# Patient Record
Sex: MALE | Race: WHITE | Employment: OTHER | URBAN - METROPOLITAN AREA
[De-identification: names, ages, dates, MRNs, and addresses within clinical notes are randomized per-mention and may not be internally consistent; named-entity substitution may affect disease eponyms.]

---

## 2021-11-02 ENCOUNTER — HOSPITAL ENCOUNTER (OUTPATIENT)
Dept: PREADMISSION TESTING | Age: 76
Discharge: HOME | End: 2021-11-02
Payer: MEDICARE

## 2021-11-02 PROCEDURE — 88305 TISSUE EXAM BY PATHOLOGIST: CPT

## 2021-11-02 PROCEDURE — U0005 INFEC AGEN DETEC AMPLI PROBE: HCPCS

## 2021-11-03 LAB — SARS-COV-2, NAA: NOT DETECTED

## 2021-11-05 ENCOUNTER — HOSPITAL ENCOUNTER (OUTPATIENT)
Age: 76
Setting detail: OUTPATIENT SURGERY
Discharge: HOME HEALTH | End: 2021-11-05
Attending: INTERNAL MEDICINE | Admitting: INTERNAL MEDICINE
Payer: MEDICARE

## 2021-11-05 ENCOUNTER — ANESTHESIA (OUTPATIENT)
Dept: ENDOSCOPY | Age: 76
End: 2021-11-05
Payer: MEDICARE

## 2021-11-05 ENCOUNTER — ANESTHESIA EVENT (OUTPATIENT)
Dept: ENDOSCOPY | Age: 76
End: 2021-11-05
Payer: MEDICARE

## 2021-11-05 VITALS
DIASTOLIC BLOOD PRESSURE: 71 MMHG | BODY MASS INDEX: 28.15 KG/M2 | HEIGHT: 72 IN | WEIGHT: 207.8 LBS | TEMPERATURE: 98 F | SYSTOLIC BLOOD PRESSURE: 123 MMHG | OXYGEN SATURATION: 100 % | HEART RATE: 52 BPM | RESPIRATION RATE: 16 BRPM

## 2021-11-05 PROCEDURE — 2709999900 HC NON-CHARGEABLE SUPPLY: Performed by: INTERNAL MEDICINE

## 2021-11-05 PROCEDURE — 74011250636 HC RX REV CODE- 250/636: Performed by: INTERNAL MEDICINE

## 2021-11-05 PROCEDURE — 74011000250 HC RX REV CODE- 250: Performed by: NURSE ANESTHETIST, CERTIFIED REGISTERED

## 2021-11-05 PROCEDURE — 77030031670 HC DEV INFL ENDOTEK BIG60 MRTM -B: Performed by: INTERNAL MEDICINE

## 2021-11-05 PROCEDURE — 76060000032 HC ANESTHESIA 0.5 TO 1 HR: Performed by: INTERNAL MEDICINE

## 2021-11-05 PROCEDURE — 77030013991 HC SNR POLYP ENDOSC BSC -A: Performed by: INTERNAL MEDICINE

## 2021-11-05 PROCEDURE — 74011250636 HC RX REV CODE- 250/636: Performed by: NURSE ANESTHETIST, CERTIFIED REGISTERED

## 2021-11-05 PROCEDURE — 76040000007: Performed by: INTERNAL MEDICINE

## 2021-11-05 PROCEDURE — C1726 CATH, BAL DIL, NON-VASCULAR: HCPCS | Performed by: INTERNAL MEDICINE

## 2021-11-05 PROCEDURE — 77030021593 HC FCPS BIOP ENDOSC BSC -A: Performed by: INTERNAL MEDICINE

## 2021-11-05 RX ORDER — PROPOFOL 10 MG/ML
INJECTION, EMULSION INTRAVENOUS AS NEEDED
Status: DISCONTINUED | OUTPATIENT
Start: 2021-11-05 | End: 2021-11-05 | Stop reason: HOSPADM

## 2021-11-05 RX ORDER — PYRIDOXINE HCL (VITAMIN B6) 100 MG
100 TABLET ORAL DAILY
COMMUNITY

## 2021-11-05 RX ORDER — ACETAMINOPHEN 325 MG/1
500 TABLET ORAL
COMMUNITY

## 2021-11-05 RX ORDER — CHOLECALCIFEROL (VITAMIN D3) 125 MCG
2 CAPSULE ORAL
COMMUNITY

## 2021-11-05 RX ORDER — ROSUVASTATIN CALCIUM 20 MG/1
20 TABLET, COATED ORAL
COMMUNITY

## 2021-11-05 RX ORDER — MELATONIN 5 MG
5 CAPSULE ORAL
COMMUNITY

## 2021-11-05 RX ORDER — SODIUM CHLORIDE 0.9 % (FLUSH) 0.9 %
5-40 SYRINGE (ML) INJECTION EVERY 8 HOURS
Status: CANCELLED | OUTPATIENT
Start: 2021-11-05

## 2021-11-05 RX ORDER — OMEPRAZOLE 20 MG/1
20 TABLET, DELAYED RELEASE ORAL DAILY
COMMUNITY

## 2021-11-05 RX ORDER — LIDOCAINE HYDROCHLORIDE 20 MG/ML
INJECTION, SOLUTION EPIDURAL; INFILTRATION; INTRACAUDAL; PERINEURAL AS NEEDED
Status: DISCONTINUED | OUTPATIENT
Start: 2021-11-05 | End: 2021-11-05 | Stop reason: HOSPADM

## 2021-11-05 RX ORDER — DEXTROMETHORPHAN/PSEUDOEPHED 2.5-7.5/.8
1.2 DROPS ORAL
Status: CANCELLED | OUTPATIENT
Start: 2021-11-05

## 2021-11-05 RX ORDER — EPINEPHRINE 0.1 MG/ML
1 INJECTION INTRACARDIAC; INTRAVENOUS
Status: CANCELLED | OUTPATIENT
Start: 2021-11-05 | End: 2021-11-06

## 2021-11-05 RX ORDER — NALOXONE HYDROCHLORIDE 0.4 MG/ML
0.4 INJECTION, SOLUTION INTRAMUSCULAR; INTRAVENOUS; SUBCUTANEOUS
Status: CANCELLED | OUTPATIENT
Start: 2021-11-05 | End: 2021-11-05

## 2021-11-05 RX ORDER — SODIUM CHLORIDE 9 MG/ML
125 INJECTION, SOLUTION INTRAVENOUS CONTINUOUS
Status: DISCONTINUED | OUTPATIENT
Start: 2021-11-05 | End: 2021-11-05 | Stop reason: HOSPADM

## 2021-11-05 RX ORDER — FLUMAZENIL 0.1 MG/ML
0.2 INJECTION INTRAVENOUS
Status: CANCELLED | OUTPATIENT
Start: 2021-11-05 | End: 2021-11-05

## 2021-11-05 RX ORDER — ASPIRIN 81 MG/1
81 TABLET ORAL DAILY
COMMUNITY

## 2021-11-05 RX ORDER — SODIUM CHLORIDE 0.9 % (FLUSH) 0.9 %
5-40 SYRINGE (ML) INJECTION AS NEEDED
Status: CANCELLED | OUTPATIENT
Start: 2021-11-05

## 2021-11-05 RX ORDER — SILODOSIN 8 MG/1
8 CAPSULE ORAL
COMMUNITY

## 2021-11-05 RX ORDER — ATROPINE SULFATE 0.1 MG/ML
0.5 INJECTION INTRAVENOUS
Status: CANCELLED | OUTPATIENT
Start: 2021-11-05 | End: 2021-11-06

## 2021-11-05 RX ORDER — METOPROLOL SUCCINATE 25 MG/1
25 TABLET, EXTENDED RELEASE ORAL DAILY
COMMUNITY

## 2021-11-05 RX ADMIN — SODIUM CHLORIDE 125 ML/HR: 900 INJECTION, SOLUTION INTRAVENOUS at 08:41

## 2021-11-05 RX ADMIN — PROPOFOL 180 MG: 10 INJECTION, EMULSION INTRAVENOUS at 10:22

## 2021-11-05 RX ADMIN — LIDOCAINE HYDROCHLORIDE 60 MG: 20 INJECTION, SOLUTION INTRAVENOUS at 10:25

## 2021-11-05 RX ADMIN — PROPOFOL 100 MG: 10 INJECTION, EMULSION INTRAVENOUS at 10:30

## 2021-11-05 NOTE — PERIOP NOTES
Reviewed discharge plan of care with patient and his family member. Written instructions provided as well.  Dr Yogesh Iglesias did also speak with patient and reviewed findings

## 2021-11-05 NOTE — PROCEDURES
Esophagogastroduodenoscopy Procedure Note    Luz Santiago  [id number]      Indications: Dysphagia    Anesthesia/Sedation: MAC anesthesia Propofol    Assistants: Endoscopy Technician-1: Penelope Nath CNA  Endoscopy RN-1: Carlee Farfan RN    Pre-Procedure Exam:  Airway: clear   Heart: normal S1and S2    Lungs: clear bilateral  Abdomen: soft, nontender, bowel sounds present and normal in all quadrants   Mental Status: awake, alert, and oriented to person, place, and time      Procedure in Detail:  Informed consent was obtained for the procedure, including conscious sedation. Risks of pancreatitis, infection, perforation, hemorrhage, adverse drug reaction, and aspiration were discussed. The patient was placed in the left lateral decubitus position. Based on the pre-procedure assessment, including review of the patient's medical history, medications, allergies, and review of systems, he had been deemed to be an appropriate candidate for moderate sedation; he was therefore sedated with the medications listed above. He was monitored continuously with electrocardiogram tracing, pulse oximetry, blood pressure monitoring, and direct observation. The pediatric colonoscope gastroscope was inserted into the mouth and advanced under direct vision to third portion of the duodenum. A careful inspection was made as the gastroscope was withdrawn, including a retroflexed view of the proximal stomach; findings and interventions are described below. Appropriate photodocumentation was obtained. Findings:   OROPHARYNX: Cords and pyriform recesses normal.   ESOPHAGUS: Schatzki's ring at GEJ, dilated with 18mm balloon with mucosal disruption.   Hiatal hernia  STOMACH: mild gastritis erythema, erosions - likely from aspirin, random biopsies taken  DUODENUM: The bulb and second portions are normal.    Therapies:    esophageal dilation with 18mm sized balloon  biopsy of stomach body, antrum    Specimens: Specimens were collected and sent to pathology. Complications:   None; patient tolerated the procedure well. EBL:  None    No prosthetic devices, grafts, tissues, transplant or devices implanted. Attending Attestation:  I performed the procedure. Recommendations:  - Continue acid suppression. Signed by: Karol Roman MD                     11/5/2021  Colonoscopy Procedure Note    Indications: Previous adenomatous polyp    Anesthesia/Sedation: MAC anesthesia Propofol    Pre-Procedure Exam:  Airway: clear   Heart: normal S1and S2    Lungs: clear bilateral  Abdomen: soft, nontender, bowel sounds present and normal in all quadrants   Mental Status: awake, alert, and oriented to person, place, and time      Procedure in Detail:  Informed consent was obtained for the procedure, including sedation. Risks of perforation, hemorrhage, adverse drug reaction, and aspiration were discussed. The patient was placed in the left lateral decubitus position. Based on the pre-procedure assessment, including review of the patient's medical history, medications, allergies, and review of systems, he had been deemed to be an appropriate candidate for moderate sedation; he was therefore sedated with the medications listed above. The patient was monitored continuously with ECG tracing, pulse oximetry, blood pressure monitoring, and direct observations. A rectal examination was performed. The [id number] was inserted into the rectum and advanced under direct vision to the cecum, which was identified by the ileocecal valve and appendiceal orifice. The quality of the colonic preparation was excellent. A careful inspection was made as the colonoscope was withdrawn, including a retroflexed view of the rectum; findings and interventions are described below. Appropriate photodocumentation was obtained.     ANUS: Anal exam reveals no masses or hemorrhoids, sphincter tone is normal.   RECTUM: Rectal exam reveals no masses or hemorrhoids. SIGMOID COLON: The mucosa is normal with good vascular pattern and without ulcers. 5mm polyp removed with cold snare. DESCENDING COLON: The mucosa is normal with good vascular pattern and without ulcers and polyps. SPLENIC FLEXURE: The splenic flexure is normal.   TRANSVERSE COLON: The mucosa is normal with good vascular pattern and without ulcers and polyps. HEPATIC FLEXURE: The hepatic flexure is normal.   ASCENDING COLON: The mucosa is normal with good vascular pattern and without ulcers, and polyps. CECUM: The appendiceal orifice appears normal. The ileocecal valve appears normal.   TERMINAL ILEUM: The terminal ileum was not entered. Specimens: Specimens were collected and sent to pathology. EBL: None    Diverticulosis mild throughout the entire colon. No prosthetic devices, grafts, tissues, transplant or devices implanted. Withdrawal Time: 12 min    Complications: None; patient tolerated the procedure well. Attending Attestation: I performed the procedure. Recommendations:   - Await pathology.     Signed By: Luiz Montgomery MD                      11/5/2021

## 2021-11-05 NOTE — ANESTHESIA POSTPROCEDURE EVALUATION
Post-Anesthesia Evaluation and Assessment    Cardiovascular Function/Vital Signs  Visit Vitals  /71   Pulse (!) 52   Temp 36.7 °C (98 °F)   Resp 16   Ht 6' (1.829 m)   Wt 94.3 kg (207 lb 12.8 oz)   SpO2 100%   BMI 28.18 kg/m²       Patient is status post Procedure(s):  EGD AND biopsy, balloon dilatation to 18mm/hg  COLONOSCOPY W/MAC. Nausea/Vomiting: Controlled. Postoperative hydration reviewed and adequate. Pain:  Pain Scale 1: Numeric (0 - 10) (11/05/21 1112)  Pain Intensity 1: 0 (11/05/21 1112)   Managed. Neurological Status: At baseline. Mental Status and Level of Consciousness: Baseline and stable. Pulmonary Status:   O2 Device: None (Room air) (11/05/21 1112)   Adequate oxygenation and airway patent. Complications related to anesthesia: None    Post-anesthesia assessment completed. No concerns. Patient has met all discharge requirements.     Signed By: Kiel uCba MD

## 2021-11-05 NOTE — ANESTHESIA PREPROCEDURE EVALUATION
Relevant Problems   No relevant active problems       Anesthetic History   No history of anesthetic complications            Review of Systems / Medical History  Patient summary reviewed, nursing notes reviewed and pertinent labs reviewed    Pulmonary        Sleep apnea           Neuro/Psych   Within defined limits           Cardiovascular    Hypertension          CAD and CABG         GI/Hepatic/Renal     GERD           Endo/Other  Within defined limits           Other Findings              Physical Exam    Airway  Mallampati: II  TM Distance: 4 - 6 cm  Neck ROM: normal range of motion   Mouth opening: Normal     Cardiovascular  Regular rate and rhythm,  S1 and S2 normal,  no murmur, click, rub, or gallop             Dental  No notable dental hx       Pulmonary  Breath sounds clear to auscultation               Abdominal  GI exam deferred       Other Findings            Anesthetic Plan    ASA: 3  Anesthesia type: MAC          Induction: Intravenous  Anesthetic plan and risks discussed with: Patient

## 2021-11-05 NOTE — H&P
[facility]enterology [address] [place] [address] [id number]  Tel: [phone]  Fax: [phone]      Patient:  Rusty Portillo  YOB: 1945   Date:                       02/09/2021 9:00 AM   Historian:                  self  Visit Type:                 Office Visit      Completed Orders (This Visit)  Order  Details  Reason  Side  Interpretation  Result  Initial Treatment Date  Region  Weight monitoring                Dietary management education, guidance, and counseling                  Assessment/Plan  #  Detail Type  Description   1. Assessment  Dysphagia (R13.10). Patient Plan  This is a pleasant 69 y/o male with h/o dysphagia several years ago and underwent EGD w/dilation - no report available. Most recently his dysphagia had recurred. Particularly chicken getting stuck. had to vomit yesterday to clear. Just underwent 3V CABG in Dec 2020 and is in cardiac rehab    PLAN  1. Soft mechanical diet. Discussed risks to include esophageal obstruction requiring emergent EGD to clear food  2. EGD w/dilation at [facility] given high risk  3. Obtain Cardiologist clearance - may need to wait 3-6  months  4. Continue Prilosec     The risks, benefits, adverse reactions were discussed in detail today with the patient. This includes, but is not limited to, the risk of bleeding, infections, perforation, death, missed lesions, reactions to anesthesia/sedation, other. They understand and agree to undergo the procedure. labs reviewed   previous notes from [facility] reviewed  tests  ordered         2. Assessment  Personal history of colonic polyps (Z86.010). Patient Plan  due for colo  [facility] -  Cardiac clearance     Colonoscopy with MAC, miralax solution. The risks, benefits, adverse reactions were discussed in detail today with the patient.   This includes, but is not limited to, the risk of bleeding, infections, perforation, death, missed lesions, reactions to anesthesia/sedation, other. They understand and agree to undergo the procedure. 3.  Assessment  Body mass index (BMI) 27.0-27.9, adult (E59.89). Plan Orders  Today's instructions / counseling include(s) Dietary management education, guidance, and counseling. Weight monitoring                 This 70year old  patient was referred by Dieudonne Vance. This 70year old male presents for Colon Cancer Screening, Constipation and Dysphagia. History of Present Illness:  1. Colon Cancer Screening   Prior screening:  colonoscopy. Denies risk factors. Associated symptoms include constipation. Pertinent negatives include abdominal pain, change in bowel habits, change in stool caliber, decreased appetite, diarrhea, melena, nausea, rectal bleeding, vomiting, weight gain and weight loss. Additional information: No family history of colon cancer, No family history of Crohn''s/colitis, NSAID/ASA use, Colonoscopy 3/2018 by me at [facility] - 4 small tubular adenomas - repeat in 3 years and due 3/2021.  2.  Constipation   Onset: gradual.  Severity level 3. The patient describes it as difficulty passing. It occurs weekly. The problem is with no change. Context: 44 sessions of proton therapy to prostate. Denies aggravating symptoms. Denies relieving factors. Pertinent negatives include abdominal pain, anorexia, back pain, black tarry stools, bleeding with bowel movement, bloating, change in appetite, change in stool caliber, change in stool pattern, flatulence, nausea, pain with passing stool, sedentary activity, vomiting, weight gain and weight loss. Additional information: No family history of Colon Cancer, No family history of Crohn''s/Colitis and No recent travel out of the country. 3.  Dysphagia   The patient denies anorexia, back pain, bloating, chest pain, decreased appetite, nausea, vomiting, weight gain or weight loss. PROBLEM LIST:   Problem List reviewed.    No active problems            PAST MEDICAL/SURGICAL HISTORY   (Detailed)    Disease/disorder  Onset Date  Management  Date  Comments  CP-ED    Cardiac Catheterization & CABG x3  12/22/2020        Space OAR, prostate fiducial markers, ultrasound guidance  10/25/2019        ultrasound guided transperineal insertion of perirectal hydrogel spacer            Family History  (Detailed)  Patient reports there is no relevant family history. Social History:  (Detailed)  Preferred language is [place]. The patient does not need an . MARITAL STATUS/FAMILY/SOCIAL SUPPORT  Marital status:   Smoking status: Never smoker. ALCOHOL  There is a history of alcohol use. Type: Wine. consumed rarely. CAFFEINE  The patient uses caffeine: coffee and tea. .            Medications (active prior to today)  Medication Name  Sig Description  Start Date  Stop Date  Refilled  Rx Elsewhere  Crestor 20 mg tablet  take 1 tablet by oral route  every day  //      Y  Prilosec OTC 20 mg tablet,delayed release    //      Y  Vitamin B-6 100 mg tablet    //      Y  Aspir-81 81 mg tablet,delayed release  take 1 tablet by oral route  every day  //      Y  Vitamin D3 5,000 unit tablet    //      Y  meloxicam 15 mg tablet  take 1 tablet by oral route  every day  //  02/09/2021    Y  melatonin 5 mg capsule    //      Y  Calcium 600 + D(3) 600 mg (1,500 mg)-400 unit tablet  1 tablet by oral route twice daily  08/24/2020 08/24/2020  N  oxybutynin chloride ER 10 mg tablet,extended release 24 hr  take 1 tablet by oral route  every day  08/24/2020 08/24/2020  N  Rapaflo 8 mg capsule  take 1 capsule by ORAL route  every day with a meal  08/24/2020 08/24/2020  N  Lupron Depot (6 Month) 45 mg intramuscular syringe kit  inject 45 milligram by intramuscular route  every 6 months  08/24/2020 08/24/2020  N  iron 325 mg (65 mg iron) tablet  take 2 tablet by oral route  every other day  //      Y  metoprolol succinate ER 25 mg tablet,extended release 24 hr  take 1 tablet by oral route  every day  //      Y  Flonase Allergy Relief 50 mcg/actuation nasal spray,suspension   as needed  //      Y    Patient Status   Completed with information received for patient transitioning into care. Completed with information received for patient in a summary of care record. Medication Reconciliation  Medications reconciled today.   Medication Reviewed  Adherence  Medication Name  Sig Desc  Elsewhere  Status  prn use  Flonase Allergy Relief 50 mcg/actuation nasal spray,suspension   as needed  Y  Verified  not taking  meloxicam 15 mg tablet  take 1 tablet by oral route  every day  Y  Verified  taking as directed  Crestor 20 mg tablet  take 1 tablet by oral route  every day  Y  Verified  taking as directed  Prilosec OTC 20 mg tablet,delayed release    Y  Verified  taking as directed  Vitamin B-6 100 mg tablet    Y  Verified  taking as directed  Aspir-81 81 mg tablet,delayed release  take 1 tablet by oral route  every day  Y  Verified  taking as directed  Vitamin D3 5,000 unit tablet    Y  Verified  taking as directed  melatonin 5 mg capsule    Y  Verified  taking as directed  Calcium 600 + D(3) 600 mg (1,500 mg)-400 unit tablet  1 tablet by oral route twice daily  N  Verified  taking as directed  oxybutynin chloride ER 10 mg tablet,extended release 24 hr  take 1 tablet by oral route  every day  N  Verified  taking as directed  Rapaflo 8 mg capsule  take 1 capsule by ORAL route  every day with a meal  N  Verified  taking as directed  Lupron Depot (6 Month) 45 mg intramuscular syringe kit  inject 45 milligram by intramuscular route  every 6 months  N  Verified  taking as directed  iron 325 mg (65 mg iron) tablet  take 2 tablet by oral route  every other day  Y  Verified  taking as directed  metoprolol succinate ER 25 mg tablet,extended release 24 hr  take 1 tablet by oral route  every day  Y  Verified    Medications (Added, Continued or Stopped today)  Start Date  Medication  Directions  PRN Status  PRN Reason  Instruction  Stop Date    Aspir-81 81 mg tablet,delayed release  take 1 tablet by oral route  every day  N        08/24/2020  Calcium 600 + D(3) 600 mg (1,500 mg)-400 unit tablet  1 tablet by oral route twice daily  N          Crestor 20 mg tablet  take 1 tablet by oral route  every day  N          Flonase Allergy Relief 50 mcg/actuation nasal spray,suspension   as needed  Y          iron 325 mg (65 mg iron) tablet  take 2 tablet by oral route  every other day  N        08/24/2020  Lupron Depot (6 Month) 45 mg intramuscular syringe kit  inject 45 milligram by intramuscular route  every 6 months  N          melatonin 5 mg capsule    N          meloxicam 15 mg tablet  take 1 tablet by oral route  every day  N      02/09/2021    metoprolol succinate ER 25 mg tablet,extended release 24 hr  take 1 tablet by oral route  every day  N        08/24/2020  oxybutynin chloride ER 10 mg tablet,extended release 24 hr  take 1 tablet by oral route  every day  N          Prilosec OTC 20 mg tablet,delayed release    N        08/24/2020  Rapaflo 8 mg capsule  take 1 capsule by ORAL route  every day with a meal  N          Vitamin B-6 100 mg tablet    N          Vitamin D3 5,000 unit tablet    N          Allergies:  Ingredient  Reaction (Severity)  Medication Name  Comment  NO KNOWN ALLERGIES        Reviewed, no changes. ORDERS:  Status  Lab Order  Time Frame  Comments  completed  Weight monitoring      completed  Dietary management education, guidance, and counseling      ordered  DIAGNOSTIC COLONOSCOPY      ordered  UPPER GI ENDOSCOPY, DIAGNOSIS        Review of System  System  Neg/Pos  Details  Constitutional  Negative  Chills, Fever, Sedentary activity, Weight gain and Weight loss. ENMT  Negative  Ear infections and Nasal congestion. Respiratory  Negative  Chronic cough, Dyspnea and Wheezing. Cardio  Negative  Chest pain.   GI  Positive  Constipation (Status: resolved), Dysphagia, Reflux. GI  Negative  Abdominal pain, Anorexia, Black tarry stools, Bleeding with bowel movement, Bloating, Change in appetite, Change in bowel habits, Change in stool caliber, Change in stool pattern, Decreased appetite, Diarrhea, Flatulence, Melena, Nausea, Painful defecation, Rectal bleeding and Vomiting.   Negative  Back pain and Dysuria. Endocrine  Negative  Cold intolerance and Heat intolerance. Neuro  Negative  Dizziness and Headache. Psych  Negative  Anxiety and Depression. MS  Negative  Back pain and Joint pain. Hema/Lymph  Negative  Easy bleeding and Easy bruising. Vital Signs  Height  Time  ft  in  cm  Last Measured  Height Position  9:11 AM  6.0  0.00  182.88  08/24/2020  0  Weight/BSA/BMI  Time  lb  oz  kg  Context  BMI kg/m2  BSA m2  9:11 AM  200.60    90.991  dressed with shoes  27.21    Blood Pressure  Time  BP mm/Hg  Position  Side  Site  Method  Cuff Size  9:11 AM  110/68            Temperature/Pulse/Respiration  Time  Temp F  Temp C  Temp Site  Pulse/min  Pattern  Resp/ min  9:11 AM        78      Pulse Oximetry/FIO2  Time  Pulse Ox (Rest %)  Pulse Ox (Amb %)  O2 Sat  O2 L/Min  Timing  FiO2 %  L/min  Delivery Method  Finger Probe  9:11 AM  99                  Measured By  Time  Measured by  9:11 AM  Shelby Castro      PHYSICAL EXAM:  Exam  Findings  Details  Constitutional  Normal  No acute distress. Well nourished. Well developed. Eyes  Normal  General - Right: Normal, Left: Normal. Sclera - Right: Normal, Left: Normal. Pupil - Right: Normal, Left: Normal. Iris - Right: Normal, Left: Normal.  Ears  Normal  Inspection - Right: Normal, Left: Normal.  Nose/Mouth/Throat  Normal  External nose - Normal. Lips/teeth/gums - Normal.  Neck Exam  Normal  Inspection - Normal. Thyroid gland - Normal. Range of motion - Normal.  Respiratory  Normal  Inspection - Normal. Auscultation - Normal. Effort - Normal.  Cardiovascular  Normal  Heart rate - Regular rate. Rhythm - Regular. Murmurs - None. Abdomen  Normal  Inspection - Normal. Appliance(s) - None. Auscultation - Normal. Anterior palpation - Normal, No guarding, No rebound. No abdominal tenderness. No hepatic enlargement. No hernia. No ascites. Hardy's sign - Normal.  Skin  *  Body areas examined - Head/face, Neck, Abdomen. Skin  Normal  Inspection - Normal. Nails - Normal.  Musculoskeletal  Normal  Visual overview of all four extremities is normal. Gait - Normal.  Extremity  Normal  No Edema. Clubbing - Absent. Neurological  Normal  Level of consciousness - Normal. Memory - Normal. Cranial nerves - Cranial nerves II through XII grossly intact. Sensory - Normal. Balance & gait - Normal. Coordination - Normal.  Psychiatric  Normal  Orientation - Oriented to time, place, person & situation. No agitation. Appropriate mood and affect. Behavior is appropriate for age. Active Patient Care Team Members    Name  Contact  Agency Type  Support Role  Relationship  Active Date  Inactive Date  Specialty  Naeem Fowler      encounter provider        Gastroenterology  Early Jumper      encounter provider        Urology  Koko Tellez      Patient provider  PCP        Jagruti Moseley      primary practice provider              Provider: Jamaal Garcias MD 02/09/2021 09:00 AM  Document generated by: Jamaal Garcias 02/09/2021    CC Providers:  Javy Cox  [address] [place] [address]   [address]  [phone]      Electronically signed by Jamaal Garcias MD on 02/09/2021 10:07 AM    Pt seen and examined.   No interval change

## 2021-11-05 NOTE — DISCHARGE INSTRUCTIONS
Douglas Zamudio  [id number]  1945    COLON / EGD DISCHARGE INSTRUCTIONS    Discomfort:  Sore throat- throat lozenges or warm salt water gargle  Redness at IV site- apply warm compress to area; if redness or soreness persist- contact your physician  There may be a slight amount of blood passed from the rectum  Gaseous discomfort- walking, belching will help relieve any discomfort  You should note operate a vehicle for 12 hours  You should not engage in an occupation involving machinery or appliances for rest of today  You may note drink alcoholic beverages for at least 12 hours  Avoid making any critical decisions for at least 24 hour  DIET:   Regular diet. - however -  remember your colon is empty and a heavy meal will produce gas. Avoid these foods:  vegetables, fried / greasy foods, carbonated drinks for today     ACTIVITY:  You may resume your normal daily activities it is recommended that you spend the remainder of the day resting -  avoid any strenuous activity. CALL M.D. ANY SIGN OF:   Increasing pain, nausea, vomiting  Abdominal distension (swelling)  New increased bleeding (oral or rectal)  Fever (chills)  Pain in chest area  Bloody discharge from nose or mouth  Shortness of breath    Follow-up Instructions:  Continue Omeprazole 20mg by mouth daily 30-60min before breakfast  Will call with biopsy results                      Douglas Zamudio  [id number]  1945        DISCHARGE SUMMARY from Nurse    The following personal items collected during your admission are returned to you:   Dental Appliance: Dental Appliances: None  Vision: Visual Aid: Glasses  Hearing Aid:    Jewelry:    Clothing:    Other Valuables:    Valuables sent to safe:      PATIENT INSTRUCTIONS:    Take Home Medications:  {Medication reconciliation information is now added to the patient's AVS automatically when it is printed. There is no need to use this SmartLink in discharge instructions.   Highlight this text and delete it to clear this message}

## 2025-05-08 ENCOUNTER — ANESTHESIA EVENT (OUTPATIENT)
Facility: HOSPITAL | Age: 80
End: 2025-05-08
Payer: MEDICARE

## 2025-05-08 ENCOUNTER — HOSPITAL ENCOUNTER (OUTPATIENT)
Facility: HOSPITAL | Age: 80
Setting detail: OUTPATIENT SURGERY
Discharge: HOME | End: 2025-05-08
Attending: INTERNAL MEDICINE | Admitting: INTERNAL MEDICINE
Payer: MEDICARE

## 2025-05-08 ENCOUNTER — ANESTHESIA (OUTPATIENT)
Facility: HOSPITAL | Age: 80
End: 2025-05-08
Payer: MEDICARE

## 2025-05-08 VITALS
RESPIRATION RATE: 15 BRPM | BODY MASS INDEX: 28.95 KG/M2 | TEMPERATURE: 97.9 F | SYSTOLIC BLOOD PRESSURE: 124 MMHG | OXYGEN SATURATION: 100 % | WEIGHT: 191 LBS | HEIGHT: 68 IN | DIASTOLIC BLOOD PRESSURE: 73 MMHG | HEART RATE: 58 BPM

## 2025-05-08 PROCEDURE — 3600007502: Performed by: INTERNAL MEDICINE

## 2025-05-08 PROCEDURE — 2580000003 HC RX 258: Performed by: ANESTHESIOLOGY

## 2025-05-08 PROCEDURE — 7100000010 HC PHASE II RECOVERY - FIRST 15 MIN: Performed by: INTERNAL MEDICINE

## 2025-05-08 PROCEDURE — 3700000001 HC ADD 15 MINUTES (ANESTHESIA): Performed by: INTERNAL MEDICINE

## 2025-05-08 PROCEDURE — 3600007512: Performed by: INTERNAL MEDICINE

## 2025-05-08 PROCEDURE — 3700000000 HC ANESTHESIA ATTENDED CARE: Performed by: INTERNAL MEDICINE

## 2025-05-08 PROCEDURE — 2720000010 HC SURG SUPPLY STERILE: Performed by: INTERNAL MEDICINE

## 2025-05-08 PROCEDURE — 7100000011 HC PHASE II RECOVERY - ADDTL 15 MIN: Performed by: INTERNAL MEDICINE

## 2025-05-08 PROCEDURE — 2709999900 HC NON-CHARGEABLE SUPPLY: Performed by: INTERNAL MEDICINE

## 2025-05-08 PROCEDURE — 6360000002 HC RX W HCPCS: Performed by: NURSE ANESTHETIST, CERTIFIED REGISTERED

## 2025-05-08 PROCEDURE — 88305 TISSUE EXAM BY PATHOLOGIST: CPT

## 2025-05-08 RX ORDER — OXYCODONE HYDROCHLORIDE 5 MG/1
5 TABLET ORAL
Refills: 0 | Status: CANCELLED | OUTPATIENT
Start: 2025-05-08

## 2025-05-08 RX ORDER — LIDOCAINE HYDROCHLORIDE 20 MG/ML
INJECTION, SOLUTION EPIDURAL; INFILTRATION; INTRACAUDAL; PERINEURAL
Status: DISCONTINUED | OUTPATIENT
Start: 2025-05-08 | End: 2025-05-08 | Stop reason: SDUPTHER

## 2025-05-08 RX ORDER — DROPERIDOL 2.5 MG/ML
0.62 INJECTION, SOLUTION INTRAMUSCULAR; INTRAVENOUS
Status: CANCELLED | OUTPATIENT
Start: 2025-05-08

## 2025-05-08 RX ORDER — DIPHENHYDRAMINE HYDROCHLORIDE 50 MG/ML
12.5 INJECTION, SOLUTION INTRAMUSCULAR; INTRAVENOUS
Status: CANCELLED | OUTPATIENT
Start: 2025-05-08

## 2025-05-08 RX ORDER — NALOXONE HYDROCHLORIDE 0.4 MG/ML
INJECTION, SOLUTION INTRAMUSCULAR; INTRAVENOUS; SUBCUTANEOUS PRN
Status: CANCELLED | OUTPATIENT
Start: 2025-05-08

## 2025-05-08 RX ORDER — SODIUM CHLORIDE 0.9 % (FLUSH) 0.9 %
5-40 SYRINGE (ML) INJECTION PRN
Status: CANCELLED | OUTPATIENT
Start: 2025-05-08

## 2025-05-08 RX ORDER — MEPERIDINE HYDROCHLORIDE 50 MG/ML
12.5 INJECTION INTRAMUSCULAR; INTRAVENOUS; SUBCUTANEOUS AS NEEDED
Refills: 0 | Status: CANCELLED | OUTPATIENT
Start: 2025-05-08

## 2025-05-08 RX ORDER — HYDROMORPHONE HYDROCHLORIDE 1 MG/ML
0.5 INJECTION, SOLUTION INTRAMUSCULAR; INTRAVENOUS; SUBCUTANEOUS EVERY 5 MIN PRN
Refills: 0 | Status: CANCELLED | OUTPATIENT
Start: 2025-05-08

## 2025-05-08 RX ORDER — SODIUM CHLORIDE 9 MG/ML
INJECTION, SOLUTION INTRAVENOUS PRN
Status: DISCONTINUED | OUTPATIENT
Start: 2025-05-08 | End: 2025-05-08 | Stop reason: HOSPADM

## 2025-05-08 RX ORDER — FUROSEMIDE 20 MG/1
20 TABLET ORAL DAILY
COMMUNITY
Start: 2025-04-15

## 2025-05-08 RX ORDER — TAMSULOSIN HYDROCHLORIDE 0.4 MG/1
0.4 CAPSULE ORAL DAILY
COMMUNITY

## 2025-05-08 RX ORDER — FEXOFENADINE HCL AND PSEUDOEPHEDRINE HCL 180; 240 MG/1; MG/1
1 TABLET, EXTENDED RELEASE ORAL DAILY
COMMUNITY
Start: 2024-08-28

## 2025-05-08 RX ORDER — FLUTICASONE PROPIONATE 50 MCG
1 SPRAY, SUSPENSION (ML) NASAL DAILY
COMMUNITY

## 2025-05-08 RX ORDER — LABETALOL HYDROCHLORIDE 5 MG/ML
10 INJECTION, SOLUTION INTRAVENOUS
Status: CANCELLED | OUTPATIENT
Start: 2025-05-08

## 2025-05-08 RX ORDER — PROPOFOL 10 MG/ML
INJECTION, EMULSION INTRAVENOUS
Status: DISCONTINUED | OUTPATIENT
Start: 2025-05-08 | End: 2025-05-08 | Stop reason: SDUPTHER

## 2025-05-08 RX ORDER — SODIUM CHLORIDE 0.9 % (FLUSH) 0.9 %
5-40 SYRINGE (ML) INJECTION EVERY 12 HOURS SCHEDULED
Status: CANCELLED | OUTPATIENT
Start: 2025-05-08

## 2025-05-08 RX ORDER — SODIUM CHLORIDE, SODIUM LACTATE, POTASSIUM CHLORIDE, CALCIUM CHLORIDE 600; 310; 30; 20 MG/100ML; MG/100ML; MG/100ML; MG/100ML
INJECTION, SOLUTION INTRAVENOUS CONTINUOUS
Status: CANCELLED | OUTPATIENT
Start: 2025-05-08

## 2025-05-08 RX ORDER — ONDANSETRON 2 MG/ML
4 INJECTION INTRAMUSCULAR; INTRAVENOUS
Status: CANCELLED | OUTPATIENT
Start: 2025-05-08

## 2025-05-08 RX ORDER — IPRATROPIUM BROMIDE AND ALBUTEROL SULFATE 2.5; .5 MG/3ML; MG/3ML
1 SOLUTION RESPIRATORY (INHALATION)
Status: CANCELLED | OUTPATIENT
Start: 2025-05-08

## 2025-05-08 RX ORDER — OMEPRAZOLE 20 MG/1
CAPSULE, DELAYED RELEASE ORAL
COMMUNITY
Start: 2025-02-07

## 2025-05-08 RX ORDER — IBUPROFEN 200 MG
400 TABLET ORAL
COMMUNITY

## 2025-05-08 RX ORDER — TADALAFIL 5 MG/1
5 TABLET ORAL DAILY
COMMUNITY
Start: 2025-04-12

## 2025-05-08 RX ORDER — FENTANYL CITRATE 50 UG/ML
25 INJECTION, SOLUTION INTRAMUSCULAR; INTRAVENOUS EVERY 5 MIN PRN
Refills: 0 | Status: CANCELLED | OUTPATIENT
Start: 2025-05-08

## 2025-05-08 RX ADMIN — PROPOFOL 20 MG: 10 INJECTION, EMULSION INTRAVENOUS at 13:33

## 2025-05-08 RX ADMIN — LIDOCAINE HYDROCHLORIDE 50 MG: 20 INJECTION, SOLUTION EPIDURAL; INFILTRATION; INTRACAUDAL; PERINEURAL at 13:17

## 2025-05-08 RX ADMIN — PROPOFOL 20 MG: 10 INJECTION, EMULSION INTRAVENOUS at 13:21

## 2025-05-08 RX ADMIN — PROPOFOL 20 MG: 10 INJECTION, EMULSION INTRAVENOUS at 13:31

## 2025-05-08 RX ADMIN — PROPOFOL 20 MG: 10 INJECTION, EMULSION INTRAVENOUS at 13:36

## 2025-05-08 RX ADMIN — PROPOFOL 30 MG: 10 INJECTION, EMULSION INTRAVENOUS at 13:19

## 2025-05-08 RX ADMIN — PROPOFOL 60 MG: 10 INJECTION, EMULSION INTRAVENOUS at 13:17

## 2025-05-08 RX ADMIN — SODIUM CHLORIDE: 900 INJECTION, SOLUTION INTRAVENOUS at 13:09

## 2025-05-08 RX ADMIN — PROPOFOL 20 MG: 10 INJECTION, EMULSION INTRAVENOUS at 13:24

## 2025-05-08 RX ADMIN — PROPOFOL 20 MG: 10 INJECTION, EMULSION INTRAVENOUS at 13:40

## 2025-05-08 RX ADMIN — PROPOFOL 20 MG: 10 INJECTION, EMULSION INTRAVENOUS at 13:38

## 2025-05-08 RX ADMIN — PROPOFOL 20 MG: 10 INJECTION, EMULSION INTRAVENOUS at 13:34

## 2025-05-08 RX ADMIN — SODIUM CHLORIDE: 900 INJECTION, SOLUTION INTRAVENOUS at 13:44

## 2025-05-08 RX ADMIN — PROPOFOL 20 MG: 10 INJECTION, EMULSION INTRAVENOUS at 13:22

## 2025-05-08 RX ADMIN — PROPOFOL 20 MG: 10 INJECTION, EMULSION INTRAVENOUS at 13:18

## 2025-05-08 RX ADMIN — PROPOFOL 20 MG: 10 INJECTION, EMULSION INTRAVENOUS at 13:27

## 2025-05-08 RX ADMIN — PROPOFOL 20 MG: 10 INJECTION, EMULSION INTRAVENOUS at 13:29

## 2025-05-08 RX ADMIN — PROPOFOL 10 MG: 10 INJECTION, EMULSION INTRAVENOUS at 13:26

## 2025-05-08 ASSESSMENT — PAIN - FUNCTIONAL ASSESSMENT
PAIN_FUNCTIONAL_ASSESSMENT: 0-10
PAIN_FUNCTIONAL_ASSESSMENT: ACTIVITIES ARE NOT PREVENTED

## 2025-05-08 ASSESSMENT — PAIN DESCRIPTION - DESCRIPTORS: DESCRIPTORS: ACHING

## 2025-05-08 NOTE — DISCHARGE INSTRUCTIONS
Yehuda Medina  [id number]  1945    COLON DISCHARGE INSTRUCTIONS    Discomfort:  Redness at IV site- apply warm compress to area; if redness or soreness persist- contact your physician  There may be a slight amount of blood passed from the rectum  Gaseous discomfort- walking, belching will help relieve any discomfort  You should not operate a vehicle for 12 hours  You should not engage in an occupation involving machinery or appliances for rest of today  You may not drink alcoholic beverages for at least 12 hours  Avoid making any critical decisions for at least 24 hour  DIET:   Regular Diet   - however -  remember your colon is empty and a heavy meal will produce gas.   Avoid these foods:  vegetables, fried / greasy foods, carbonated drinks for today     ACTIVITY:  You may resume your normal daily activities it is recommended that you spend the remainder of the day resting -  avoid any strenuous activity.    CALL M.D.  ANY SIGN OF:   Increasing pain, nausea, vomiting  Abdominal distension (swelling)  New increased bleeding (oral or rectal)  Fever (chills)  Pain in chest area  Bloody discharge from nose or mouth  Shortness of breath      Follow-up Instructions:  F/u in clinic  Pending path                          Yehuda Medina  [id number]  1945        DISCHARGE SUMMARY from Nurse    The following personal items collected during your admission are returned to you:   Dental Appliance:    Vision:    Hearing Aid:    Jewelry:    Clothing:    Other Valuables:    Valuables sent to safe: Dose (mL/hr) Propofol : *20 mg    [unfilled]            DISCHARGE SUMMARY from Nurse    PATIENT INSTRUCTIONS:    After general anesthesia or intravenous sedation, for 24 hours or while taking prescription Narcotics:  Limit your activities  Do not drive and operate hazardous machinery  Do not make important personal or business decisions  Do  not drink alcoholic beverages  If you have not urinated within 8 hours after  medications reviewed with the patient and caregiver and appropriate educational materials and side effects teaching were provided.  ___________________________________________________________________________________________________________________________________

## 2025-05-08 NOTE — ANESTHESIA PRE PROCEDURE
Department of Anesthesiology  Preprocedure Note       Name:  Yehuda Medina   Age:  79 y.o.  :  1945                                          MRN:  [id number]         Date:  2025      Surgeon: Surgeon(s):  Teresa Jo MD    Procedure: Procedure(s):  COLONOSCOPY DIAGNOSTIC    Medications prior to admission:   Prior to Admission medications    Medication Sig Start Date End Date Taking? Authorizing Provider   acetaminophen (TYLENOL) 325 MG tablet Take 500 mg by mouth every 4 hours as needed    Automatic Reconciliation, Ar   aspirin 81 MG EC tablet Take 81 mg by mouth daily    Automatic Reconciliation, Ar   Cholecalciferol 50 MCG (2000) TABS Take 2 tablets by mouth    Automatic Reconciliation, Ar   Melatonin 5 MG CAPS Take 5 mg by mouth    Automatic Reconciliation, Ar   metoprolol succinate (TOPROL XL) 25 MG extended release tablet Take 25 mg by mouth daily    Automatic Reconciliation, Ar   omeprazole (PRILOSEC OTC) 20 MG tablet Take 20 mg by mouth daily    Automatic Reconciliation, Ar   pyridoxine (B-6) 100 MG tablet Take 100 mg by mouth daily    Automatic Reconciliation, Ar   rosuvastatin (CRESTOR) 20 MG tablet Take 20 mg by mouth    Automatic Reconciliation, Ar   silodosin (RAPAFLO) 8 MG CAPS Take 8 mg by mouth daily (with breakfast)    Automatic Reconciliation, Ar       Current medications:    Current Facility-Administered Medications   Medication Dose Route Frequency Provider Last Rate Last Admin   • 0.9 % sodium chloride infusion   IntraVENous PRN Anselmo Cornelius MD           Allergies:    Allergies   Allergen Reactions   • Latex Itching   • Adhesive Tape Rash       Problem List:  There is no problem list on file for this patient.      Past Medical History:        Diagnosis Date   • CAD (coronary artery disease)    • Cancer (HCC)     PROSTATE   • GERD (gastroesophageal reflux disease)    • Hypertension    • Sleep apnea     CPAP   • Thromboembolus (HCC)     RIGHT LEG       Past Surgical

## 2025-05-08 NOTE — H&P
Assessment/Plan  # Detail Type Description    1. Assessment Esophageal dysphagia (R13.19).    Patient Plan Pt is a 80 yo with hx of dysphagia. He has been well managed on omeprazole 20 mg and he here today for a refill. He reports no breakthrough symptoms of heartburn, reflux, or dysphagia. No n/v, abd pain, melena, weight loss. He is taking it correctly.     Plan:  - Refill omeprazole 20 mg  - F/u 1 yr         2. Assessment BRBPR (bright red blood per rectum) (K62.5).    Patient Plan Pt reports BRBPR with daily bms for the last 3-4 months. Blood is sometimes wipe type and other times coloring toilet water. [place] ranges from type 1 with straining to type 7, states 4 is most common. He has hx of hemorrhoids in the past and has been offered referral to colorectal surgery but was not ready for it then, he is ready for it now. He has been treated hemorrhoid at home with preparation h suppository and cream without success.     External exam reveals large vascular grape like cluster without bleeding or thrombosis, suspect prolapsed internal hemorrhoid    Plan:  - Schedule colonoscopy  - CRC referral     MDM:   - 1 or more chronic illnesses with exacerbation, progression   - review external notes  - reviewed test results  - referral given    Plan Orders DIAGNOSTIC COLONOSCOPY to be performed. Daly Hu DO -Colon and Rectal Surgery. Clinical information/comments: Hemorrhoids with bleeding.         3. Assessment Body mass index (BMI) 31.0-31.9, adult (Z68.31).    Plan Orders Today's instructions / counseling include(s) Dietary management education, guidance, and counseling and Weight monitoring .         4. Other Orders Orders not associated to today's assessments.    Plan Orders Active Medication: omeprazole 20 mg capsule,delayed release              This 79 year old  patient was referred by Roya Roland.  This 79 year old patient presents for follow up of GERD.    History of Present Illness  1.  Follow Up of  omeprazole 20 mg capsule,delayed release take 1 capsule by oral route mouth, 30 minutes to 1 hour before breakfast N      04/09/2025 omeprazole 20 mg capsule,delayed release take 1 capsule by oral route  every morning 30 minutes to 1 hour before a meal N      03/28/2025 tadalafil 5 mg tablet take 1 tablet by oral route  every day N       Tylenol  N      08/28/2024 Vitamin B-12  2,500 mcg sublingual tablet take 1 tablet by oral route  every 2 days N        Allergies  Ingredient Reaction (Severity) Comment   LATEX Itching      Reviewed, no changes.        Review of Systems  System Neg/Pos Details   Constitutional Negative Weight loss.   GI Positive Hematochezia.   GI Negative Abdominal pain, Change in bowel habits, Constipation, Diarrhea, Dysphagia, Heartburn, Melena, Nausea, Reflux and Vomiting.       Vital Signs   Height  Time ft in cm Last Measured Height Position   11:39 AM 5.0 8.00 172.72 03/28/2025 0     Weight/BSA/BMI  Time lb oz kg Context BMI kg/m2 BSA m2   11:39 .40  94.075  31.53      Blood Pressure  Time BP mm/Hg Position Side Site Method Cuff Size   11:39 /76 sitting left arm automatic      Temperature/Pulse/Respiration  Time Temp F Temp C Temp Site Pulse/min Pattern Resp/ min   11:39 AM    58       Pulse Oximetry/FIO2  Time Pulse Ox (Rest %) Pulse Ox (Amb %) O2 Sat O2 L/Min Timing FiO2 % L/min Delivery Method Finger Probe   11:39 AM 96  RA           Measured by  Time Measured by   11:39 AM Maxine Hannah     Physical  Exam  Exam Findings Details   Constitutional Normal Well developed.   Rectal Comments large vascular, grapelike cluster in 2 o'clock position without thrombosis or bleeding. Chaperone Maxine Hannah   Psychiatric Normal Orientation - Oriented to time, place, person & situation. Appropriate mood and affect.       Pt seen and examined.  No interval change

## 2025-05-08 NOTE — ANESTHESIA POSTPROCEDURE EVALUATION
Post-Anesthesia Evaluation and Assessment    Cardiovascular Function/Vital Signs  Visit Vitals  /73   Pulse 58   Temp 36.6 °C (97.9 °F)   Resp 15   Ht 1.727 m (5' 8\")   Wt 86.6 kg (191 lb)   SpO2 100%   BMI 29.04 kg/m²       Patient is status post Procedure(s):  COLONOSCOPY DIAGNOSTIC; POLYPECTOMY; CAUTERIZATION OF AVMS IN RECTUM;.    Nausea/Vomiting: Controlled.    Postoperative hydration reviewed and adequate.    Pain:      Managed.    Neurological Status:       At baseline.    Mental Status and Level of Consciousness: Arousable.    Pulmonary Status:       Adequate oxygenation and airway patent.    Complications related to anesthesia: None    Post-anesthesia assessment completed. No concerns.    Patient has met all discharge requirements.    Signed By: ALBERTA Pro - LLUVIA    May 8, 2025

## 2025-05-08 NOTE — PROCEDURES
PROCEDURE NOTE  Date: 5/8/2025   Name: Yehuda Medina  YOB: 1945        Colonoscopy Procedure Note    Indications: Rectal bleeding    Anesthesia/Sedation: MAC anesthesia Propofol    Pre-Procedure Exam:  Airway: clear   Heart: normal S1and S2    Lungs: clear bilateral  Abdomen: soft, nontender, bowel sounds present and normal in all quadrants   Mental Status: awake, alert, and oriented to person, place, and time      Procedure in Detail:  Informed consent was obtained for the procedure, including sedation.  Risks of perforation, hemorrhage, adverse drug reaction, and aspiration were discussed. The patient was placed in the left lateral decubitus position.  Based on the pre-procedure assessment, including review of the patient's medical history, medications, allergies, and review of systems, he had been deemed to be an appropriate candidate for moderate sedation; he was therefore sedated with the medications listed above.   The patient was monitored continuously with ECG tracing, pulse oximetry, blood pressure monitoring, and direct observations.      A rectal examination was performed. The [id number] was inserted into the rectum and advanced under direct vision to the cecum, which was identified by the ileocecal valve and appendiceal orifice.  The quality of the colonic preparation was excellent.  A careful inspection was made as the colonoscope was withdrawn, including a retroflexed view of the rectum; findings and interventions are described below.  Appropriate photodocumentation was obtained.    ANUS: Anal exam reveals no masses or external hemorrhoids, sphincter tone is normal.   RECTUM: Rectal exam reveals no masses or hemorrhoids. AVMs - ablated with APC  SIGMOID COLON: The mucosa is normal with good vascular pattern and without ulcers, diverticula, and polyps.   DESCENDING COLON: The mucosa is normal with good vascular pattern and without ulcers, diverticula, and polyps.   SPLENIC FLEXURE: The

## (undated) DEVICE — SNARE POLYP SM W13MMXL240CM SHTH DIA2.4MM OVL FLX DISP

## (undated) DEVICE — TRAP SPEC COLL POLYP POLYSTYR --

## (undated) DEVICE — AIRLIFE™ NASAL OXYGEN CANNULA CURVED, FLARED TIP WITH 14 FOOT (4.3 M) CRUSH-RESISTANT TUBING, OVER-THE-EAR STYLE: Brand: AIRLIFE™

## (undated) DEVICE — WRISTBAND ID AD W2.5XL9.5CM RED VYN ADH CLSR UNI-PRINT 655-16-PDJ

## (undated) DEVICE — MOUTHPIECE ENDOSCP 20X27MM --

## (undated) DEVICE — TRAP SPEC POLYP REM STRNR CLN DSGN MAGNIFYING WIND DISP

## (undated) DEVICE — FIAPC® PROBE W/ FILTER 2200 A OD 2.3MM/6.9FR; L 2.2M/7.2FT: Brand: ERBE

## (undated) DEVICE — CATH IV SAFE STR 22GX1IN BLU -- PROTECTIV PLUS

## (undated) DEVICE — SYRINGE 50ML E/T

## (undated) DEVICE — CANNULA CUSH AD W/ 14FT TBG

## (undated) DEVICE — FORCEPS BX CAP 240CM L RAD JAW 4

## (undated) DEVICE — Device

## (undated) DEVICE — TUBING, SUCTION, 1/4" X 12', STRAIGHT: Brand: MEDLINE

## (undated) DEVICE — SYRINGE MED 5ML STD CLR PLAS LUERLOCK TIP N CTRL DISP

## (undated) DEVICE — SET ADMIN 16ML TBNG L100IN 2 Y INJ SITE IV PIGGY BK DISP

## (undated) DEVICE — DEVICE INFL 60ML 12ATM CONVENIENT LOK REL HNDL HI PRSS FLX

## (undated) DEVICE — ENDO CARRY-ON PROCEDURE KIT INCLUDES ENZYMATIC SPONGE, GAUZE, BIOHAZARD LABEL, TRAY, LUBRICANT, DIRTY SCOPE LABEL, WATER LABEL, TRAY, DRAWSTRING PAD, AND DEFENDO 4-PIECE KIT.: Brand: ENDO CARRY-ON PROCEDURE KIT

## (undated) DEVICE — SYR 3ML LL TIP 1/10ML GRAD --

## (undated) DEVICE — SOLUTION IV 1000ML 20MEQ K CHL IN 0.9% SOD CHL FLX CONT

## (undated) DEVICE — CATHETER IV 22GA L1IN BLU POLYUR STR HUB RADPQ PROTCT +

## (undated) DEVICE — 4-PORT MANIFOLD: Brand: NEPTUNE 2

## (undated) DEVICE — BLUNTFILL: Brand: MONOJECT

## (undated) DEVICE — MAJ-1414 SINGLE USE ADPATER BIOPSY VALV: Brand: SINGLE USE ADAPTOR BIOPSY VALVE

## (undated) DEVICE — ERBE NESSY®PLATE 170 SPLIT; 168CM²; CABLE 3M: Brand: ERBE

## (undated) DEVICE — SYRINGE MEDICAL 3ML CLEAR PLASTIC STANDARD NON CONTROL LUERLOCK TIP DISPOSABLE

## (undated) DEVICE — Device: Brand: SINGLE USE ELECTROSURGICAL SNARE SD-400

## (undated) DEVICE — NDL PRT INJ NSAF BLNT 18GX1.5 --

## (undated) DEVICE — BRUSH CYTO L240CM DIA2.3MM GI COLONOSCOPY 3 RNG HNDL RADPQ

## (undated) DEVICE — TOURNIQUET PHLEB W1XL18IN BLU FLAT RL AND BND REUSE FOR IV

## (undated) DEVICE — SYR 50ML SLIP TIP NSAF LF STRL --

## (undated) DEVICE — ESOPHAGEAL/COLONIC/BILIARY WIREGUIDED BALLOON DILATATION CATHETER: Brand: CRE™ PRO

## (undated) DEVICE — KENDALL RADIOLUCENT FOAM MONITORING ELECTRODE RECTANGULAR SHAPE: Brand: KENDALL

## (undated) DEVICE — CATHETER PH SUCT 14FR

## (undated) DEVICE — ALL PURPOSE SPONGES,NON-WOVEN, 4 PLY: Brand: CURITY

## (undated) DEVICE — SINGLE PORT MANIFOLD: Brand: NEPTUNE 2

## (undated) DEVICE — EJECTOR SALIVA 6 IN FLX CLR

## (undated) DEVICE — TRNQT TEXT 1X18IN BLU LF DISP -- CONVERT TO ITEM 362165